# Patient Record
Sex: FEMALE | Race: BLACK OR AFRICAN AMERICAN | Employment: FULL TIME | URBAN - METROPOLITAN AREA
[De-identification: names, ages, dates, MRNs, and addresses within clinical notes are randomized per-mention and may not be internally consistent; named-entity substitution may affect disease eponyms.]

---

## 2017-10-21 ENCOUNTER — HOSPITAL ENCOUNTER (EMERGENCY)
Age: 27
Discharge: HOME | End: 2017-10-22
Attending: EMERGENCY MEDICINE
Payer: SELF-PAY

## 2017-10-21 ENCOUNTER — APPOINTMENT (OUTPATIENT)
Dept: GENERAL RADIOLOGY | Age: 27
End: 2017-10-21
Attending: EMERGENCY MEDICINE
Payer: SELF-PAY

## 2017-10-21 DIAGNOSIS — S93.401A SPRAIN OF RIGHT ANKLE, UNSPECIFIED LIGAMENT, INITIAL ENCOUNTER: Primary | ICD-10-CM

## 2017-10-21 PROCEDURE — 73610 X-RAY EXAM OF ANKLE: CPT

## 2017-10-21 PROCEDURE — 75810000053 HC SPLINT APPLICATION: Performed by: EMERGENCY MEDICINE

## 2017-10-21 PROCEDURE — 81003 URINALYSIS AUTO W/O SCOPE: CPT | Performed by: EMERGENCY MEDICINE

## 2017-10-21 PROCEDURE — 99284 EMERGENCY DEPT VISIT MOD MDM: CPT | Performed by: EMERGENCY MEDICINE

## 2017-10-21 PROCEDURE — 73562 X-RAY EXAM OF KNEE 3: CPT

## 2017-10-21 NOTE — LETTER
[facility] [facility] EMERGENCY DEPT One [address] [address] [phone] Work/School Note Date: 10/21/2017 To Whom It May concern: 
 
Misty Staton was seen and treated today in the emergency room by the following provider(s): 
Attending Provider: DO. Misty Washington {Return to school/sport/work:06047} Sincerely, Bouchra Magdaleno RN

## 2017-10-21 NOTE — LETTER
[address] [facility] EMERGENCY DEPT One [address] [address] 
[phone] Work/School Note Date: 10/21/2017 To Whom It May concern: 
 
Edwina Nurys was seen and treated today in the emergency room by the following provider(s): 
Attending Provider: DO. Edwina Mccollum Special Instructions:  No weight bearing on the ankle until 10/25/2017. Sincerely, Jonathan Cruz RN

## 2017-10-22 VITALS
OXYGEN SATURATION: 99 % | WEIGHT: 231 LBS | RESPIRATION RATE: 16 BRPM | TEMPERATURE: 98.9 F | HEIGHT: 63 IN | BODY MASS INDEX: 40.93 KG/M2 | DIASTOLIC BLOOD PRESSURE: 71 MMHG | HEART RATE: 81 BPM | SYSTOLIC BLOOD PRESSURE: 136 MMHG

## 2017-10-22 LAB
BACTERIA URNS QL MICRO: ABNORMAL /HPF
CASTS URNS QL MICRO: 0 /LPF
CRYSTALS URNS QL MICRO: 0 /LPF
EPI CELLS #/AREA URNS HPF: ABNORMAL /HPF
HCG UR QL: NEGATIVE
MUCOUS THREADS URNS QL MICRO: 0 /LPF
OTHER OBSERVATIONS,UCOM: ABNORMAL
RBC #/AREA URNS HPF: 0 /HPF
WBC URNS QL MICRO: ABNORMAL /HPF

## 2017-10-22 PROCEDURE — 81015 MICROSCOPIC EXAM OF URINE: CPT | Performed by: EMERGENCY MEDICINE

## 2017-10-22 PROCEDURE — 81025 URINE PREGNANCY TEST: CPT

## 2017-10-22 PROCEDURE — 74011250637 HC RX REV CODE- 250/637: Performed by: EMERGENCY MEDICINE

## 2017-10-22 PROCEDURE — L4350 ANKLE CONTROL ORTHO PRE OTS: HCPCS

## 2017-10-22 RX ORDER — DICLOFENAC SODIUM 75 MG/1
75 TABLET, DELAYED RELEASE ORAL 2 TIMES DAILY
Qty: 20 TAB | Refills: 0 | Status: SHIPPED | OUTPATIENT
Start: 2017-10-22 | End: 2018-11-13 | Stop reason: CLARIF

## 2017-10-22 RX ORDER — IBUPROFEN 800 MG/1
800 TABLET ORAL
Status: COMPLETED | OUTPATIENT
Start: 2017-10-22 | End: 2017-10-22

## 2017-10-22 RX ADMIN — IBUPROFEN 800 MG: 800 TABLET ORAL at 00:29

## 2017-10-22 NOTE — DISCHARGE INSTRUCTIONS
Ankle Sprain: Care Instructions  Your Care Instructions    An ankle sprain can happen when you twist your ankle. The ligaments that support the ankle can get stretched and torn. Often the ankle is swollen and painful. Ankle sprains may take from several weeks to several months to heal. Usually, the more pain and swelling you have, the more severe your ankle sprain is and the longer it will take to heal. You can heal faster and regain strength in your ankle with good home treatment. It is very important to give your ankle time to heal completely, so that you do not easily hurt your ankle again. Follow-up care is a key part of your treatment and safety. Be sure to make and go to all appointments, and call your doctor if you are having problems. It's also a good idea to know your test results and keep a list of the medicines you take. How can you care for yourself at home? · Prop up your foot on pillows as much as possible for the next 3 days. Try to keep your ankle above the level of your heart. This will help reduce the swelling. · Follow your doctor's directions for wearing a splint or elastic bandage. Wrapping the ankle may help reduce or prevent swelling. · Your doctor may give you a splint, a brace, an air stirrup, or another form of ankle support to protect your ankle until it is healed. Wear it as directed while your ankle is healing. Do not remove it unless your doctor tells you to. After your ankle has healed, ask your doctor whether you should wear the brace when you exercise. · Put ice or cold packs on your injured ankle for 10 to 20 minutes at a time. Try to do this every 1 to 2 hours for the next 3 days (when you are awake) or until the swelling goes down. Put a thin cloth between the ice and your skin. · You may need to use crutches until you can walk without pain. If you do use crutches, try to bear some weight on your injured ankle if you can do so without pain.  This helps the ankle heal.  · Take pain medicines exactly as directed. ¨ If the doctor gave you a prescription medicine for pain, take it as prescribed. ¨ If you are not taking a prescription pain medicine, ask your doctor if you can take an over-the-counter medicine. · If you have been given ankle exercises to do at home, do them exactly as instructed. These can promote healing and help prevent lasting weakness. When should you call for help? Call your doctor now or seek immediate medical care if:  · Your pain is getting worse. · Your swelling is getting worse. · Your splint feels too tight or you are unable to loosen it. Watch closely for changes in your health, and be sure to contact your doctor if:  · You are not getting better after 1 week. Where can you learn more? Go to http://michael-lucas.info/. Enter D870 in the search box to learn more about \"Ankle Sprain: Care Instructions. \"  Current as of: March 21, 2017  Content Version: 11.3  © 1170-3968 [employer]. Care instructions adapted under license by [employer] (which disclaims liability or warranty for this information). If you have questions about a medical condition or this instruction, always ask your healthcare professional. [place] [address] any warranty or liability for your use of this information. Ankle Sprain: Rehab Exercises  Your Care Instructions  Here are some examples of typical rehabilitation exercises for your condition. Start each exercise slowly. Ease off the exercise if you start to have pain. Your doctor or physical therapist will tell you when you can start these exercises and which ones will work best for you. How to do the exercises  \"Alphabet\" exercise    1. Trace the alphabet with your toe. This helps your ankle move in all directions. Side-to-side knee swing exercise    1. Sit in a chair with your foot flat on the floor. 2. Slowly move your knee from side to side.  Keep your foot pressed flat. 3. Continue this exercise for 2 to 3 minutes. Towel curl    1. While sitting, place your foot on a towel on the floor. Scrunch the towel toward you with your toes. 2. Then use your toes to push the towel away from you. 3. To make this exercise more challenging you can put something on the other end of the towel. A can of soup is about the right weight for this. Towel stretch    1. Sit with your legs extended and knees straight. 2. Place a towel around your foot just under the toes. 3. Hold each end of the towel in each hand, with your hands above your knees. 4. Pull back with the towel so that your foot stretches toward you. 5. Hold the position for at least 15 to 30 seconds. 6. Repeat 2 to 4 times a session. Do up to 5 sessions a day. Ankle eversion exercise    1. Start by sitting with your foot flat on the floor. Push your foot outward against a wall or a piece of furniture that doesn't move. Hold for about 6 seconds, and relax. Repeat 8 to 12 times. 2. After you feel comfortable with this, try using rubber tubing looped around the outside of your feet for resistance. Push your foot out to the side against the tubing, and then count to 10 as you slowly bring your foot back to the middle. Repeat 8 to 12 times. Isometric opposition exercises    1. While sitting, put your feet together flat on the floor. 2. Press your injured foot inward against your other foot. Hold for about 6 seconds, and relax. Repeat 8 to 12 times. 3. Then place the heel of your other foot on top of the injured one. Push down with the top heel while trying to push up with your injured foot. Hold for about 6 seconds, and relax. Repeat 8 to 12 times. Resisted ankle inversion    1. Sit on the floor with your good leg crossed over your other leg. 2. Hold both ends of an exercise band and loop the band around the inside of your affected foot. Then press your other foot against the band.   3. Keeping your legs crossed, slowly push your affected foot against the band so that foot moves away from your other foot. Then slowly relax. 4. Repeat 8 to 12 times. Resisted ankle eversion    1. Sit on the floor with your legs straight. 2. Hold both ends of an exercise band and loop the band around the outside of your affected foot. Then press your other foot against the band. 3. Keeping your leg straight, slowly push your affected foot outward against the band and away from your other foot without letting your leg rotate. Then slowly relax. 4. Repeat 8 to 12 times. Resisted ankle dorsiflexion    1. Tie the ends of an exercise band together to form a loop. Attach one end of the loop to a secure object or shut a door on it to hold it in place. (Or you can have someone hold one end of the loop to provide resistance.)  2. While sitting on the floor or in a chair, loop the other end of the band over the top of your affected foot. 3. Keeping your knee and leg straight, slowly flex your foot to pull back on the exercise band, and then slowly relax. 4. Repeat 8 to 12 times. Single-leg balance    1. Stand on a flat surface with your arms stretched out to your sides like you are making the letter \"T. \" Then lift your good leg off the floor, bending it at the knee. If you are not steady on your feet, use one hand to hold on to a chair, counter, or wall. 2. Standing on the leg with your affected ankle, keep that knee straight. Try to balance on that leg for up to 30 seconds. Then rest for up to 10 seconds. 3. Repeat 6 to 8 times. 4. When you can balance on your affected leg for 30 seconds with your eyes open, try to balance on it with your eyes closed. When you can do this exercise with your eyes closed for 30 seconds and with ease and no pain, try standing on a pillow or piece of foam, and repeat steps 1 through 4. Follow-up care is a key part of your treatment and safety.  Be sure to make and go to all appointments, and call your doctor if you are having problems. It's also a good idea to know your test results and keep a list of the medicines you take. Where can you learn more? Go to http://michael-lucas.info/. Juhi Santiago in the search box to learn more about \"Ankle Sprain: Rehab Exercises. \"  Current as of: March 21, 2017  Content Version: 11.3  © 8711-5613 [employer]. Care instructions adapted under license by [employer] (which disclaims liability or warranty for this information). If you have questions about a medical condition or this instruction, always ask your healthcare professional. [address] any warranty or liability for your use of this information.

## 2017-10-22 NOTE — ED PROVIDER NOTES
HPI Comments: Patient presents more prominent complaint of right ankle pain after falling twice while roller skating this evening. She denies any other injuries has no other complaints. She states the pain really around the lateral malleolus radiating up the leg    Patient is a 24 y.o. female presenting with ankle pain. The history is provided by the patient. Ankle Pain    This is a new problem. The problem occurs constantly. The problem has not changed since onset. The pain is present in the right ankle. The quality of the pain is described as aching. The pain is severe. Pertinent negatives include no numbness, full range of motion, no stiffness and no tingling. The symptoms are aggravated by standing. She has tried cold for the symptoms. The treatment provided no relief. There has been a history of trauma. History reviewed. No pertinent past medical history. History reviewed. No pertinent surgical history. History reviewed. No pertinent family history. Social History     Social History    Marital status: SINGLE     Spouse name: N/A    Number of children: N/A    Years of education: N/A     Occupational History    Not on file. Social History Main Topics    Smoking status: Never Smoker    Smokeless tobacco: Not on file    Alcohol use No    Drug use: No    Sexual activity: Yes     Partners: Male     Other Topics Concern    Not on file     Social History Narrative         ALLERGIES: Pcn [penicillins]    Review of Systems   Musculoskeletal: Negative for stiffness. Neurological: Negative for tingling and numbness. All other systems reviewed and are negative. Vitals:    10/21/17 2339   BP: 143/89   Pulse: 85   Resp: 16   Temp: 98.7 °F (37.1 °C)   SpO2: 99%   Weight: 104.8 kg (231 lb)   Height: 5' 3\" (1.6 m)            Physical Exam   Constitutional: She is oriented to person, place, and time. She appears well-developed and well-nourished. No distress.    HENT:   Head: Normocephalic and atraumatic. Eyes: Conjunctivae and EOM are normal. Pupils are equal, round, and reactive to light. Musculoskeletal: Normal range of motion. She exhibits tenderness. She exhibits no edema or deformity. Tenderness noted to the ligaments of the lateral malleolus there is no bony tenderness no deformitiesand significant swelling or bruising is noted the Achilles tendon is intact and nontender no calcaneal or midfoot or fifth metatarsal tenderness is noted no proximal fibular tenderness is noted   Neurological: She is alert and oriented to person, place, and time. Skin: Skin is warm and dry. Psychiatric: She has a normal mood and affect. Her behavior is normal.   Nursing note and vitals reviewed.        MDM  Number of Diagnoses or Management Options  Sprain of right ankle, unspecified ligament, initial encounter:      Amount and/or Complexity of Data Reviewed  Tests in the radiology section of CPT®: ordered and reviewed    Risk of Complications, Morbidity, and/or Mortality  Presenting problems: low  Diagnostic procedures: low  Management options: low    Patient Progress  Patient progress: stable    ED Course       Procedures

## 2018-11-13 PROBLEM — E66.01 OBESITY, MORBID (HCC): Status: ACTIVE | Noted: 2018-11-13

## 2019-10-23 NOTE — ED NOTES
I have reviewed discharge instructions with the patient. The patient verbalized understanding. Patient left ED via Discharge Method: wheelchair to Home with grandmother    Opportunity for questions and clarification provided. Patient given 1 scripts.  Ace wrap ankle splint, crutches, work ntoe Student

## 2021-12-13 NOTE — ED TRIAGE NOTES
Pt arrives to this facility reporting right leg pain x 2 hours. Pt reports being at the roller rink and falling twice this evening but denies pain r/t those falls. Pt reports the pain shot up her leg when she took her skate off of that right foot. Pt reporting pain to the ankle and knee. Pt alert and oriented x 4. [Nl] : Genitourinary [FreeTextEntry2] : thin female  [FreeTextEntry6] : age [age] pneumonia - started medication for genetic mutation  [FreeTextEntry7] : no feeding tube

## 2022-03-19 PROBLEM — E66.01 OBESITY, MORBID (HCC): Status: ACTIVE | Noted: 2018-11-13

## 2024-02-18 ENCOUNTER — HOSPITAL ENCOUNTER (EMERGENCY)
Age: 34
Discharge: HOME | End: 2024-02-18
Attending: GENERAL PRACTICE

## 2024-02-18 VITALS
RESPIRATION RATE: 19 BRPM | WEIGHT: 270 LBS | BODY MASS INDEX: 47.84 KG/M2 | TEMPERATURE: 98.6 F | HEIGHT: 63 IN | SYSTOLIC BLOOD PRESSURE: 136 MMHG | DIASTOLIC BLOOD PRESSURE: 79 MMHG | OXYGEN SATURATION: 99 % | HEART RATE: 76 BPM

## 2024-02-18 DIAGNOSIS — U07.1 COVID-19 VIRUS INFECTION: Primary | ICD-10-CM

## 2024-02-18 LAB
FLUAV RNA SPEC QL NAA+PROBE: NOT DETECTED
FLUBV RNA SPEC QL NAA+PROBE: NOT DETECTED
SARS-COV-2 RDRP RESP QL NAA+PROBE: DETECTED
SOURCE: ABNORMAL

## 2024-02-18 PROCEDURE — 87502 INFLUENZA DNA AMP PROBE: CPT

## 2024-02-18 PROCEDURE — 99283 EMERGENCY DEPT VISIT LOW MDM: CPT

## 2024-02-18 PROCEDURE — 87635 SARS-COV-2 COVID-19 AMP PRB: CPT

## 2024-02-18 ASSESSMENT — PAIN SCALES - GENERAL
PAINLEVEL_OUTOF10: 7
PAINLEVEL_OUTOF10: 7

## 2024-02-18 ASSESSMENT — PAIN DESCRIPTION - LOCATION: LOCATION: ABDOMEN;HEAD

## 2024-02-18 ASSESSMENT — LIFESTYLE VARIABLES
HOW OFTEN DO YOU HAVE A DRINK CONTAINING ALCOHOL: NEVER
HOW MANY STANDARD DRINKS CONTAINING ALCOHOL DO YOU HAVE ON A TYPICAL DAY: PATIENT DOES NOT DRINK

## 2024-02-18 ASSESSMENT — PAIN - FUNCTIONAL ASSESSMENT
PAIN_FUNCTIONAL_ASSESSMENT: 0-10
PAIN_FUNCTIONAL_ASSESSMENT: 0-10

## 2024-02-19 NOTE — ED TRIAGE NOTES
Pt to ED with cough, congestion, chills, headache. Pt states grandparents tested positive for covid on 02/14, pt was never tested. Pt reports taking tylenol around 1830. Pt reports history of asthma. No acute distress at present.

## 2024-02-19 NOTE — ED PROVIDER NOTES
Emergency Department Provider Note       PCP: Gerard Strong MD   Age: 33 y.o.   Sex: female     DISPOSITION Decision To Discharge 02/18/2024 08:20:35 PM       ICD-10-CM    1. COVID-19 virus infection  U07.1           Medical Decision Making     Complexity of Problems Addressed:  1 acute uncomplicated illness    Data Reviewed and Analyzed:  I independently ordered and reviewed each unique test.  I reviewed external records: provider visit note from PCP.  I reviewed external records: previous lab results from outside ED.           Discussion of management or test interpretation.  Patient presents with signs and symptoms of a viral illness.  She is positive for COVID-19.  However, she is well-appearing with normal oxygen levels and no respiratory distress.  There is nothing to suggest pneumonia or dehydration or any other emergent pathology or complication.  She will be treated symptomatically expectantly with close follow-up return precautions            Risk of Complications and/or Morbidity of Patient Management:  Over the counter drug management performed.  Shared medical decision making was utilized in creating the patients health plan today.        History       Patient presents with cough, sore throat, runny nose, body aches, chills, and fever.  She also has a headache.  She also has a mild upset stomach.  But she denies vomiting but admits to some diarrhea.  She has been around her grandparents who tested positive for COVID-19 4 days ago.         Review of Systems   All other systems reviewed and are negative.      Physical Exam     Vitals signs and nursing note reviewed:  Vitals:    02/18/24 1928   BP: (!) 143/92   Pulse: 96   Resp: 17   Temp: 98.6 °F (37 °C)   TempSrc: Oral   SpO2: 100%   Weight: 122.5 kg (270 lb)   Height: 1.6 m (5' 3\")      Physical Exam  Constitutional:       General: She is not in acute distress.  HENT:      Head: Normocephalic and atraumatic.      Nose: Nose normal.

## 2024-02-19 NOTE — ED NOTES
I have reviewed discharge instructions with the patient.  The patient verbalized understanding.    Patient left ED via Discharge Method: ambulatory to Home by self.    Opportunity for questions and clarification provided.       Patient given 0 scripts.

## 2024-04-19 ENCOUNTER — HOSPITAL ENCOUNTER (EMERGENCY)
Age: 34
Discharge: HOME | End: 2024-04-19
Attending: EMERGENCY MEDICINE
Payer: COMMERCIAL

## 2024-04-19 VITALS
TEMPERATURE: 97.7 F | DIASTOLIC BLOOD PRESSURE: 97 MMHG | BODY MASS INDEX: 46.06 KG/M2 | SYSTOLIC BLOOD PRESSURE: 159 MMHG | HEART RATE: 57 BPM | WEIGHT: 260 LBS | RESPIRATION RATE: 18 BRPM | OXYGEN SATURATION: 99 %

## 2024-04-19 DIAGNOSIS — H10.32 ACUTE CONJUNCTIVITIS OF LEFT EYE, UNSPECIFIED ACUTE CONJUNCTIVITIS TYPE: Primary | ICD-10-CM

## 2024-04-19 PROCEDURE — 99283 EMERGENCY DEPT VISIT LOW MDM: CPT

## 2024-04-19 PROCEDURE — 6370000000 HC RX 637 (ALT 250 FOR IP): Performed by: EMERGENCY MEDICINE

## 2024-04-19 RX ORDER — TETRACAINE HYDROCHLORIDE 5 MG/ML
1 SOLUTION OPHTHALMIC
Status: COMPLETED | OUTPATIENT
Start: 2024-04-19 | End: 2024-04-19

## 2024-04-19 RX ORDER — ERYTHROMYCIN 5 MG/G
OINTMENT OPHTHALMIC
Status: COMPLETED | OUTPATIENT
Start: 2024-04-19 | End: 2024-04-19

## 2024-04-19 RX ORDER — ERYTHROMYCIN 5 MG/G
OINTMENT OPHTHALMIC
Qty: 3.5 G | Refills: 0 | Status: SHIPPED | OUTPATIENT
Start: 2024-04-19

## 2024-04-19 RX ADMIN — FLUORESCEIN SODIUM 1 MG: 1 STRIP OPHTHALMIC at 23:06

## 2024-04-19 RX ADMIN — TETRACAINE HYDROCHLORIDE 1 DROP: 5 SOLUTION OPHTHALMIC at 23:06

## 2024-04-19 RX ADMIN — ERYTHROMYCIN: 5 OINTMENT OPHTHALMIC at 23:06

## 2024-04-19 ASSESSMENT — VISUAL ACUITY
OU: 20/25
OS: 20/50
OD: 20/30

## 2024-04-19 ASSESSMENT — ENCOUNTER SYMPTOMS
EYE PAIN: 0
EYE ITCHING: 1
EYE REDNESS: 1
EYE DISCHARGE: 1

## 2024-04-19 ASSESSMENT — PAIN - FUNCTIONAL ASSESSMENT: PAIN_FUNCTIONAL_ASSESSMENT: 0-10

## 2024-04-20 NOTE — DISCHARGE INSTRUCTIONS
Use the erythromycin ointment every 4-6 hours as needed for comfort with a minimum of every 4 hours to treat the infection.  You can use warm compresses over your eye if it mats shut again in the morning.  Wash your hands frequently as this is contagious and can be spread to others.

## 2024-04-20 NOTE — ED TRIAGE NOTES
Pt coming in for possible pink eye in her left eye. Pt woke up with it swollen shut w/ pain and itchiness. Pt states she has had discharge from the eye all day. Pt states vision is hazy but able to make out objects and words.

## 2024-04-20 NOTE — ED PROVIDER NOTES
Emergency Department Provider Note       PCP: Gerard Strong MD   Age: 33 y.o.   Sex: female     DISPOSITION Decision To Discharge 04/19/2024 11:17:33 PM       ICD-10-CM    1. Acute conjunctivitis of left eye, unspecified acute conjunctivitis type  H10.32           Medical Decision Making     DDX:    Conjunctivitis, orbital cellulitis, periorbital cellulitis, globe rupture, corneal abrasion, iritis, uveitis,        1 acute, uncomplicated illness or injury.  Prescription drug management performed.  Shared medical decision making was utilized in creating the patients health plan today.    I independently ordered and reviewed each unique test.                     History     33-year-old female presenting to the emergency department today secondary to itching and discharge from the left eye which started this morning.  The patient woke up and her eye lashes were matted shut.  The patient has not been evaluated has been sick as far she is aware but she does work with vulnerable adults.  She is post to go to work this evening and 11:00 and because of the itching she was told that she needed to be seen by a physician.          ROS     Review of Systems   Eyes:  Positive for discharge, redness and itching. Negative for pain and visual disturbance.        Physical Exam     Vitals signs and nursing note reviewed:  Vitals:    04/19/24 2233   BP: (!) 159/97   Pulse: 57   Resp: 18   Temp: 97.7 °F (36.5 °C)   TempSrc: Oral   SpO2: 99%   Weight: 117.9 kg (260 lb)      Physical Exam     GENERAL:The patient has Body mass index is 46.06 kg/m². Well-hydrated.  No acute distress.  VITAL SIGNS: Heart rate, blood pressure, respiratory rate reviewed as recorded in  nurse's notes  EYES: right eye is clear.  Examination of the left eye reveals bulbar conjunctival injection in clinic yellowish discharge.  There is no erythema or edema to the upper or lower eyelid.  Extraocular muscles are intact without pain or limitation